# Patient Record
Sex: MALE | Race: WHITE
[De-identification: names, ages, dates, MRNs, and addresses within clinical notes are randomized per-mention and may not be internally consistent; named-entity substitution may affect disease eponyms.]

---

## 2019-04-30 ENCOUNTER — HOSPITAL ENCOUNTER (EMERGENCY)
Dept: HOSPITAL 36 - ER | Age: 39
Discharge: HOME | End: 2019-04-30
Payer: MEDICAID

## 2019-04-30 DIAGNOSIS — S01.111D: Primary | ICD-10-CM
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
## 2019-04-30 PROCEDURE — Z7502: HCPCS

## 2019-04-30 NOTE — ED PHYSICIAN CHART
ED Chief Complaint/HPI





- Patient Information


Date Seen:: 04/30/19


Time Seen:: 13:30


Chief Complaint:: r eyebrow sutures in 7


Allergies:: 


 Allergies











Allergy/AdvReac Type Severity Reaction Status Date / Time


 


No Known Allergies Allergy   Verified 04/30/19 13:48











Vitals:: 


 Vital Signs - 8 hr











  04/30/19





  13:25


 


Temp 97.1 F


 


HR 92


 


/85


 


O2 Sat % 95











Historian:: Patient


Review:: Nurse's Note Reviewed (no reported infectio pus)





ED Review of Systems





- Review of Systems


General/Constitutional: No fever


Skin: Skin lesions


Head: No headache


Eyes: No loss of vision


ENT: No earache


Neck: No neck pain


Cardio Vascular: No chest pain


Pulmonary: No SOB


Endocrine: No polyuria


Psychiatric: Prior psych history


Hematopoietic: No bruising


Allergic/Immuno: No urticaria


Neurological: No syncope





ED Past Medical History





- Past Medical History


Past Medical History: No significant medical hx


Social History: Illicit Drug Use





Family Medical History





- Family Member


  ** Mother


History Unknown: Yes





ED Physical Exam





- Physical Examination


General/Constitutional: Well-developed, well-nourished, Alert, No distress, Non-

toxic appearing


Eyes: Lids, conjuctiva normal (3 sutures removed)


Skin: No rash (no redness)


ENMT: External ears, nose nl


Neck: Nontender


Respiratory: Nl effort/Exclusion


Cardio Vascular: RRR


GI: No tenderness/rebounding/guarding


Extremities: No tenderness or effusion


Neuro/Psych: Alert/oriented


Misc: Normal back





ED Labs/Radiology/EKG Results





- Lab Results


Results: 





3 out





ED Assessment





- Assessment


General Assessment: 





suturee out 





ED Septic Shock





- .


Is Septic Shock (SBP<90, OR Lactate>4 mmol\L) present?: No





- <6hrs of presentation:


Vital Signs: 


 Vital Signs - 8 hr











  04/30/19





  13:25


 


Temp 97.1 F


 


HR 92


 


/85


 


O2 Sat % 95














ED Reassessment (Disposition)





- Patient Disposition


Discharge/Transfer:: Home (redness pus pmd)

## 2021-09-30 ENCOUNTER — HOSPITAL ENCOUNTER (EMERGENCY)
Dept: HOSPITAL 4 - SED | Age: 41
Discharge: HOME | End: 2021-09-30
Payer: MEDICAID

## 2021-09-30 VITALS — BODY MASS INDEX: 24.25 KG/M2 | HEIGHT: 68 IN | WEIGHT: 160 LBS

## 2021-09-30 VITALS — SYSTOLIC BLOOD PRESSURE: 130 MMHG

## 2021-09-30 VITALS — SYSTOLIC BLOOD PRESSURE: 131 MMHG

## 2021-09-30 DIAGNOSIS — F17.290: ICD-10-CM

## 2021-09-30 DIAGNOSIS — N43.3: Primary | ICD-10-CM

## 2021-09-30 DIAGNOSIS — I10: ICD-10-CM

## 2021-09-30 DIAGNOSIS — N50.89: ICD-10-CM

## 2021-09-30 DIAGNOSIS — Z71.6: ICD-10-CM

## 2021-09-30 LAB
APPEARANCE UR: (no result)
BACTERIA URNS QL MICRO: (no result) /HPF
BILIRUB UR QL STRIP: NEGATIVE
COLOR UR: YELLOW
GLUCOSE UR STRIP-MCNC: NEGATIVE MG/DL
HGB UR QL STRIP: (no result)
KETONES UR STRIP-MCNC: NEGATIVE MG/DL
LEUKOCYTE ESTERASE UR QL STRIP: (no result)
NITRITE UR QL STRIP: NEGATIVE
PH UR STRIP: 6 [PH] (ref 5–8)
PROT UR QL STRIP: NEGATIVE
RBC #/AREA URNS HPF: (no result) /HPF (ref 0–3)
SP GR UR STRIP: >=1.03 (ref 1–1.03)
UROBILINOGEN UR STRIP-MCNC: 0.2 MG/DL (ref 0.2–1)

## 2021-09-30 PROCEDURE — 87086 URINE CULTURE/COLONY COUNT: CPT

## 2021-09-30 PROCEDURE — 87591 N.GONORRHOEAE DNA AMP PROB: CPT

## 2021-09-30 PROCEDURE — 96372 THER/PROPH/DIAG INJ SC/IM: CPT

## 2021-09-30 PROCEDURE — 81000 URINALYSIS NONAUTO W/SCOPE: CPT

## 2021-09-30 PROCEDURE — 87491 CHLMYD TRACH DNA AMP PROBE: CPT

## 2021-09-30 PROCEDURE — 76870 US EXAM SCROTUM: CPT

## 2021-09-30 PROCEDURE — 99284 EMERGENCY DEPT VISIT MOD MDM: CPT

## 2021-09-30 NOTE — NUR
PT FROM HOME C/O ABD PAIN AND LUMP RT TESTICLE TREATED FOR SAME [facility] HAD US DONE SENT HOME